# Patient Record
Sex: FEMALE | Race: WHITE | Employment: OTHER | URBAN - NONMETROPOLITAN AREA
[De-identification: names, ages, dates, MRNs, and addresses within clinical notes are randomized per-mention and may not be internally consistent; named-entity substitution may affect disease eponyms.]

---

## 2019-05-02 ENCOUNTER — TRANSFERRED RECORDS (OUTPATIENT)
Dept: HEALTH INFORMATION MANAGEMENT | Facility: HOSPITAL | Age: 64
End: 2019-05-02

## 2019-09-16 NOTE — PROGRESS NOTES
Dasia Clement is a 64 year old female who presents to clinic today for the following health issues:        New Patient/Transfer of Care  Was seen at [facility] for years      History of Elevated A1C - 6.4 on 5/1/19    How often are you checking your blood sugar? Not at all    What time of day are you checking your blood sugars (select all that apply)?  na    Have you had any blood sugars above 200?  No    Have you had any blood sugars below 70?  No    What symptoms do you notice when your blood sugar is low?  None    What concerns do you have today about your diabetes? None     Do you have any of these symptoms? (Select all that apply)  No numbness or tingling in feet.  No redness, sores or blisters on feet.  No complaints of excessive thirst.  No reports of blurry vision.  No significant changes to weight.     Have you had a diabetic eye exam in the last 12 months? No        BP Readings from Last 2 Encounters:   09/17/19 118/76     No results found for: A1C, LDL       Diabetes Management Resources          Hypothyroidism Follow-up    Since last visit, patient describes the following symptoms: Weight stable, no hair loss, no skin changes, no constipation, no loose stools, dry skin, anxiety, fatigue and hair loss      PHQ-9 SCORE 9/17/2019   PHQ-9 Total Score 2     JESSA-7 SCORE 9/17/2019   Total Score 2           Patient Active Problem List   Diagnosis     Degenerative joint disease of cervical and lumbar spine     Essential hypertension     Hypothyroidism due to Hashimoto's thyroiditis     Impaired fasting glucose     Obesity, unspecified     Osteoarthritis of lumbar spine with myelopathy     Chronic pain syndrome     Advanced directives, counseling/discussion     History reviewed. No pertinent surgical history.    Social History     Tobacco Use     Smoking status: Former Smoker     Packs/day: 1.00     Years: 12.00     Pack years: 12.00     Types: Cigarettes     Start date: 1985     Last attempt to quit: 1997     " Years since quittin.7     Smokeless tobacco: Never Used   Substance Use Topics     Alcohol use: Never     Frequency: Never     History reviewed. No pertinent family history.        Current Outpatient Medications   Medication Sig Dispense Refill     cholecalciferol (VITAMIN D3) 5000 units (125 mcg) capsule Take 5,000 Units by mouth See Admin Instructions Patient taking once a week       cyclobenzaprine (FLEXERIL) 10 MG tablet Take 10 mg by mouth       diclofenac (VOLTAREN) 1 % topical gel Apply 4 g topically       furosemide (LASIX) 20 MG tablet Take 20 mg by mouth       hydrochlorothiazide (HYDRODIURIL) 25 MG tablet Take 25 mg by mouth       levothyroxine (SYNTHROID/LEVOTHROID) 112 MCG tablet Take 112 mcg by mouth           Allergies   Allergen Reactions     Amitriptyline      Possibly too high of a dose--sleeping,vomiting,hives     Erythromycin Hives and Unknown     thraot closing     Levofloxacin      Pain in feet , neck and hands       Tramadol Other (See Comments)     tongue swelled     No lab results found.   BP Readings from Last 3 Encounters:   19 118/76    Wt Readings from Last 3 Encounters:   19 86 kg (189 lb 11.2 oz)                 Reviewed and updated as needed this visit by Provider  Tobacco         Review of Systems   ROS COMP: Constitutional, HEENT, cardiovascular, pulmonary, GI, , musculoskeletal, neuro, skin, endocrine and psych systems are negative, except as otherwise noted.      Objective    /76 (BP Location: Right arm, Patient Position: Sitting, Cuff Size: Adult Regular)   Pulse 95   Temp 95.8  F (35.4  C) (Tympanic)   Ht 1.6 m (5' 3\")   Wt 86 kg (189 lb 11.2 oz)   SpO2 98%   BMI 33.60 kg/m    Body mass index is 33.6 kg/m .     Physical Exam   GENERAL: healthy, alert and no distress  EYES: Eyes grossly normal to inspection, PERRL and conjunctivae and sclerae normal  NECK: no adenopathy, no asymmetry, masses, or scars and thyroid normal to palpation  RESP: lungs " clear to auscultation - no rales, rhonchi or wheezes  CV: regular rate and rhythm, normal S1 S2, no S3 or S4, no murmur, click or rub, no peripheral edema and peripheral pulses strong  MS: multilevel back pain, bilateral foot pain  SKIN: no suspicious lesions or rashes  PSYCH: mentation appears normal, affect normal/bright  Diabetic foot exam: normal DP and PT pulses, no trophic changes or ulcerative lesions and normal sensory exam      Results for orders placed or performed in visit on 09/17/19   *UA reflex to Microscopic   Result Value Ref Range    Color Urine Yellow     Appearance Urine Clear     Glucose Urine Negative NEG^Negative mg/dL    Bilirubin Urine Small (A) NEG^Negative    Ketones Urine Negative NEG^Negative mg/dL    Specific Gravity Urine 1.015 1.003 - 1.035    Blood Urine Small (A) NEG^Negative    pH Urine 6.0 5.0 - 7.0 pH    Protein Albumin Urine Negative NEG^Negative mg/dL    Urobilinogen Urine 0.2 0.2 - 1.0 EU/dL    Nitrite Urine Negative NEG^Negative    Leukocyte Esterase Urine Negative NEG^Negative    Source Midstream Urine    Albumin Random Urine Quantitative with Creat Ratio   Result Value Ref Range    Creatinine Urine 245 mg/dL    Albumin Urine mg/L 15 mg/L    Albumin Urine mg/g Cr 6.29 0 - 25 mg/g Cr   Hemoglobin A1c   Result Value Ref Range    Hemoglobin A1C 6.3 (H) 0 - 5.6 %   Lipid Profile   Result Value Ref Range    Cholesterol 141 <200 mg/dL    Triglycerides 111 <150 mg/dL    HDL Cholesterol 46 (L) >49 mg/dL    LDL Cholesterol Calculated 73 <100 mg/dL    Non HDL Cholesterol 95 <130 mg/dL   TSH with free T4 reflex   Result Value Ref Range    TSH 3.38 0.40 - 4.00 mU/L   Comprehensive metabolic panel (BMP + Alb, Alk Phos, ALT, AST, Total. Bili, TP)   Result Value Ref Range    Sodium 140 133 - 144 mmol/L    Potassium 3.7 3.4 - 5.3 mmol/L    Chloride 105 94 - 109 mmol/L    Carbon Dioxide 23 20 - 32 mmol/L    Anion Gap 12 3 - 14 mmol/L    Glucose 123 (H) 70 - 99 mg/dL    Urea Nitrogen 18 7 - 30  mg/dL    Creatinine 0.69 0.52 - 1.04 mg/dL    GFR Estimate >90 >60 mL/min/[1.73_m2]    GFR Estimate If Black >90 >60 mL/min/[1.73_m2]    Calcium 10.1 8.5 - 10.1 mg/dL    Bilirubin Total 0.3 0.2 - 1.3 mg/dL    Albumin 4.1 3.4 - 5.0 g/dL    Protein Total 8.2 6.8 - 8.8 g/dL    Alkaline Phosphatase 67 40 - 150 U/L    ALT 30 0 - 50 U/L    AST 13 0 - 45 U/L   Urine Microscopic   Result Value Ref Range    WBC Urine 0 - 5 OTO5^0 - 5 /HPF    RBC Urine O - 2 OTO2^O - 2 /HPF    Squamous Epithelial /LPF Urine Moderate (A) FEW^Few /LPF    Bacteria Urine Few (A) NEG^Negative /HPF   Estimated Average Glucose   Result Value Ref Range    Estimated Average Glucose 134 mg/dL           Visit time:     Over 40  minutes  spent with the patient.   Greater than 50% of our visit time was spent face to face and included patient education and counseling regarding current conditions.   Medication management, and plan of care.    Impaired fasting glucose with history of elevated A1C - discussed diet  Chronic pain management discussed  Detailed plan of care is provided, AVS printed  Visit Content:   Referrals: [facility]  Diagnostic testing reviewed  Reviewed appropriate outside records  Lab testing reviewed  Any medication adjustment has been reviewed  Health Maintenance   Updated as appropriate.  Record review completed         Assessment & Plan     1. Encounter to establish care  - Chart updated    2. Hypothyroidism due to Hashimoto's thyroiditis  - TSH with free T4 reflex    3. Impaired fasting glucose  - UA reflex to Microscopic  - Albumin Random Urine Quantitative with Creat Ratio  - Hemoglobin A1c    4. Essential hypertension  - Lipid Profile  - Comprehensive metabolic panel (BMP + Alb, Alk Phos, ALT, AST, Total. Bili, TP)    5. Osteoarthritis of lumbar spine with myelopathy  - Information given regarding medical marijuana    6. Chronic pain syndrome  - See above    7. Advanced directives, counseling/discussion  - Discussed         Return  in 6 months (on 3/17/2020) for Chronic disease management, am fasting.       Sydnee Quispe, CNP  [facility]

## 2019-09-17 ENCOUNTER — OFFICE VISIT (OUTPATIENT)
Dept: FAMILY MEDICINE | Facility: OTHER | Age: 64
End: 2019-09-17
Attending: NURSE PRACTITIONER
Payer: COMMERCIAL

## 2019-09-17 VITALS
BODY MASS INDEX: 33.61 KG/M2 | HEART RATE: 95 BPM | HEIGHT: 63 IN | SYSTOLIC BLOOD PRESSURE: 118 MMHG | OXYGEN SATURATION: 98 % | WEIGHT: 189.7 LBS | DIASTOLIC BLOOD PRESSURE: 76 MMHG | TEMPERATURE: 95.8 F

## 2019-09-17 DIAGNOSIS — E06.3 HYPOTHYROIDISM DUE TO HASHIMOTO'S THYROIDITIS: ICD-10-CM

## 2019-09-17 DIAGNOSIS — Z71.89 ADVANCED DIRECTIVES, COUNSELING/DISCUSSION: ICD-10-CM

## 2019-09-17 DIAGNOSIS — M47.16 OSTEOARTHRITIS OF LUMBAR SPINE WITH MYELOPATHY: ICD-10-CM

## 2019-09-17 DIAGNOSIS — G89.4 CHRONIC PAIN SYNDROME: ICD-10-CM

## 2019-09-17 DIAGNOSIS — R73.01 IMPAIRED FASTING GLUCOSE: ICD-10-CM

## 2019-09-17 DIAGNOSIS — Z76.89 ENCOUNTER TO ESTABLISH CARE: Primary | ICD-10-CM

## 2019-09-17 DIAGNOSIS — I10 ESSENTIAL HYPERTENSION: ICD-10-CM

## 2019-09-17 LAB
ALBUMIN SERPL-MCNC: 4.1 G/DL (ref 3.4–5)
ALBUMIN UR-MCNC: NEGATIVE MG/DL
ALP SERPL-CCNC: 67 U/L (ref 40–150)
ALT SERPL W P-5'-P-CCNC: 30 U/L (ref 0–50)
ANION GAP SERPL CALCULATED.3IONS-SCNC: 12 MMOL/L (ref 3–14)
APPEARANCE UR: CLEAR
AST SERPL W P-5'-P-CCNC: 13 U/L (ref 0–45)
BACTERIA #/AREA URNS HPF: ABNORMAL /HPF
BILIRUB SERPL-MCNC: 0.3 MG/DL (ref 0.2–1.3)
BILIRUB UR QL STRIP: ABNORMAL
BUN SERPL-MCNC: 18 MG/DL (ref 7–30)
CALCIUM SERPL-MCNC: 10.1 MG/DL (ref 8.5–10.1)
CHLORIDE SERPL-SCNC: 105 MMOL/L (ref 94–109)
CHOLEST SERPL-MCNC: 141 MG/DL
CO2 SERPL-SCNC: 23 MMOL/L (ref 20–32)
COLOR UR AUTO: YELLOW
CREAT SERPL-MCNC: 0.69 MG/DL (ref 0.52–1.04)
CREAT UR-MCNC: 245 MG/DL
EST. AVERAGE GLUCOSE BLD GHB EST-MCNC: 134 MG/DL
GFR SERPL CREATININE-BSD FRML MDRD: >90 ML/MIN/{1.73_M2}
GLUCOSE SERPL-MCNC: 123 MG/DL (ref 70–99)
GLUCOSE UR STRIP-MCNC: NEGATIVE MG/DL
HBA1C MFR BLD: 6.3 % (ref 0–5.6)
HDLC SERPL-MCNC: 46 MG/DL
HGB UR QL STRIP: ABNORMAL
KETONES UR STRIP-MCNC: NEGATIVE MG/DL
LDLC SERPL CALC-MCNC: 73 MG/DL
LEUKOCYTE ESTERASE UR QL STRIP: NEGATIVE
MICROALBUMIN UR-MCNC: 15 MG/L
MICROALBUMIN/CREAT UR: 6.29 MG/G CR (ref 0–25)
NITRATE UR QL: NEGATIVE
NON-SQ EPI CELLS #/AREA URNS LPF: ABNORMAL /LPF
NONHDLC SERPL-MCNC: 95 MG/DL
PH UR STRIP: 6 PH (ref 5–7)
POTASSIUM SERPL-SCNC: 3.7 MMOL/L (ref 3.4–5.3)
PROT SERPL-MCNC: 8.2 G/DL (ref 6.8–8.8)
RBC #/AREA URNS AUTO: ABNORMAL /HPF
SODIUM SERPL-SCNC: 140 MMOL/L (ref 133–144)
SOURCE: ABNORMAL
SP GR UR STRIP: 1.01 (ref 1–1.03)
TRIGL SERPL-MCNC: 111 MG/DL
TSH SERPL DL<=0.005 MIU/L-ACNC: 3.38 MU/L (ref 0.4–4)
UROBILINOGEN UR STRIP-ACNC: 0.2 EU/DL (ref 0.2–1)
WBC #/AREA URNS AUTO: ABNORMAL /HPF

## 2019-09-17 PROCEDURE — 82043 UR ALBUMIN QUANTITATIVE: CPT | Performed by: NURSE PRACTITIONER

## 2019-09-17 PROCEDURE — 84443 ASSAY THYROID STIM HORMONE: CPT | Performed by: NURSE PRACTITIONER

## 2019-09-17 PROCEDURE — 80061 LIPID PANEL: CPT | Performed by: NURSE PRACTITIONER

## 2019-09-17 PROCEDURE — 99203 OFFICE O/P NEW LOW 30 MIN: CPT | Performed by: NURSE PRACTITIONER

## 2019-09-17 PROCEDURE — 81001 URINALYSIS AUTO W/SCOPE: CPT | Performed by: NURSE PRACTITIONER

## 2019-09-17 PROCEDURE — 36415 COLL VENOUS BLD VENIPUNCTURE: CPT | Performed by: NURSE PRACTITIONER

## 2019-09-17 PROCEDURE — 80053 COMPREHEN METABOLIC PANEL: CPT | Performed by: NURSE PRACTITIONER

## 2019-09-17 PROCEDURE — 83036 HEMOGLOBIN GLYCOSYLATED A1C: CPT | Performed by: NURSE PRACTITIONER

## 2019-09-17 RX ORDER — FUROSEMIDE 20 MG
20 TABLET ORAL
COMMUNITY
Start: 2019-05-14 | End: 2020-11-03

## 2019-09-17 RX ORDER — HYDROCHLOROTHIAZIDE 25 MG/1
25 TABLET ORAL DAILY
Qty: 90 TABLET | Refills: 1 | Status: SHIPPED | OUTPATIENT
Start: 2019-09-17 | End: 2020-07-02

## 2019-09-17 RX ORDER — CYCLOBENZAPRINE HCL 10 MG
10 TABLET ORAL 2 TIMES DAILY PRN
Qty: 90 TABLET | Refills: 1 | Status: SHIPPED | OUTPATIENT
Start: 2019-09-17 | End: 2020-01-13

## 2019-09-17 RX ORDER — CYCLOBENZAPRINE HCL 10 MG
10 TABLET ORAL
COMMUNITY
Start: 2019-06-28 | End: 2019-09-17

## 2019-09-17 RX ORDER — HYDROCHLOROTHIAZIDE 25 MG/1
25 TABLET ORAL
COMMUNITY
Start: 2019-05-02 | End: 2019-09-17

## 2019-09-17 RX ORDER — LEVOTHYROXINE SODIUM 112 UG/1
112 TABLET ORAL DAILY
Qty: 90 TABLET | Refills: 1 | Status: SHIPPED | OUTPATIENT
Start: 2019-09-17 | End: 2020-07-02

## 2019-09-17 RX ORDER — LEVOTHYROXINE SODIUM 112 UG/1
112 TABLET ORAL
COMMUNITY
Start: 2019-05-02 | End: 2019-09-17

## 2019-09-17 SDOH — HEALTH STABILITY: MENTAL HEALTH: HOW OFTEN DO YOU HAVE A DRINK CONTAINING ALCOHOL?: NEVER

## 2019-09-17 ASSESSMENT — ANXIETY QUESTIONNAIRES
7. FEELING AFRAID AS IF SOMETHING AWFUL MIGHT HAPPEN: NOT AT ALL
3. WORRYING TOO MUCH ABOUT DIFFERENT THINGS: NOT AT ALL
6. BECOMING EASILY ANNOYED OR IRRITABLE: NOT AT ALL
2. NOT BEING ABLE TO STOP OR CONTROL WORRYING: NOT AT ALL
1. FEELING NERVOUS, ANXIOUS, OR ON EDGE: NOT AT ALL
5. BEING SO RESTLESS THAT IT IS HARD TO SIT STILL: NOT AT ALL
GAD7 TOTAL SCORE: 2
IF YOU CHECKED OFF ANY PROBLEMS ON THIS QUESTIONNAIRE, HOW DIFFICULT HAVE THESE PROBLEMS MADE IT FOR YOU TO DO YOUR WORK, TAKE CARE OF THINGS AT HOME, OR GET ALONG WITH OTHER PEOPLE: SOMEWHAT DIFFICULT

## 2019-09-17 ASSESSMENT — PATIENT HEALTH QUESTIONNAIRE - PHQ9
SUM OF ALL RESPONSES TO PHQ QUESTIONS 1-9: 2
5. POOR APPETITE OR OVEREATING: MORE THAN HALF THE DAYS

## 2019-09-17 ASSESSMENT — PAIN SCALES - GENERAL: PAINLEVEL: EXTREME PAIN (8)

## 2019-09-17 ASSESSMENT — MIFFLIN-ST. JEOR: SCORE: 1379.6

## 2019-09-17 NOTE — RESULT ENCOUNTER NOTE
Labs look good overall.  A1C a bit elevated, have her work on diet, carbs  Recheck A1C in 3 mos    The 10-year ASCVD risk score (Puma DUMONT Jr., et al., 2013) is: 3.8%    Values used to calculate the score:      Age: 64 years      Sex: Female      Is Non- : No      Diabetic: No      Tobacco smoker: No      Systolic Blood Pressure: 118 mmHg      Is BP treated: No      HDL Cholesterol: 46 mg/dL      Total Cholesterol: 141 mg/dL    TSH normal.  Will send her synthroid Rx for refill, [facility].    Sydnee Quispe [facility]  [phone]

## 2019-09-17 NOTE — PATIENT INSTRUCTIONS
Assessment & Plan     1. Encounter to establish care  - Chart updated    2. Hypothyroidism due to Hashimoto's thyroiditis  - TSH with free T4 reflex    3. Impaired fasting glucose  - UA reflex to Microscopic  - Albumin Random Urine Quantitative with Creat Ratio  - Hemoglobin A1c    4. Essential hypertension  - Lipid Profile  - Comprehensive metabolic panel (BMP + Alb, Alk Phos, ALT, AST, Total. Bili, TP)    5. Osteoarthritis of lumbar spine with myelopathy  - Information given regarding medical marijuana    6. Chronic pain syndrome  - See above    7. Advanced directives, counseling/discussion  - Discussed        Return in 6 months (on 3/17/2020) for Chronic disease management, am fasting.         Sydnee Quispe, CNP  [facility]

## 2019-09-17 NOTE — LETTER
[facility]  [address]  [place] [address]  Phone: [phone]    September 18, 2019        Dasia Muñoz  [address] [place] [address]    Dear Dasia;    Attached are your lab results from yesterdays visit.  Please call with any questions.      Results for orders placed or performed in visit on 09/17/19   *UA reflex to Microscopic   Result Value Ref Range    Color Urine Yellow     Appearance Urine Clear     Glucose Urine Negative NEG^Negative mg/dL    Bilirubin Urine Small (A) NEG^Negative    Ketones Urine Negative NEG^Negative mg/dL    Specific Gravity Urine 1.015 1.003 - 1.035    Blood Urine Small (A) NEG^Negative    pH Urine 6.0 5.0 - 7.0 pH    Protein Albumin Urine Negative NEG^Negative mg/dL    Urobilinogen Urine 0.2 0.2 - 1.0 EU/dL    Nitrite Urine Negative NEG^Negative    Leukocyte Esterase Urine Negative NEG^Negative    Source Midstream Urine    Albumin Random Urine Quantitative with Creat Ratio   Result Value Ref Range    Creatinine Urine 245 mg/dL    Albumin Urine mg/L 15 mg/L    Albumin Urine mg/g Cr 6.29 0 - 25 mg/g Cr   Hemoglobin A1c   Result Value Ref Range    Hemoglobin A1C 6.3 (H) 0 - 5.6 %   Lipid Profile   Result Value Ref Range    Cholesterol 141 <200 mg/dL    Triglycerides 111 <150 mg/dL    HDL Cholesterol 46 (L) >49 mg/dL    LDL Cholesterol Calculated 73 <100 mg/dL    Non HDL Cholesterol 95 <130 mg/dL   TSH with free T4 reflex   Result Value Ref Range    TSH 3.38 0.40 - 4.00 mU/L   Comprehensive metabolic panel (BMP + Alb, Alk Phos, ALT, AST, Total. Bili, TP)   Result Value Ref Range    Sodium 140 133 - 144 mmol/L    Potassium 3.7 3.4 - 5.3 mmol/L    Chloride 105 94 - 109 mmol/L    Carbon Dioxide 23 20 - 32 mmol/L    Anion Gap 12 3 - 14 mmol/L    Glucose 123 (H) 70 - 99 mg/dL    Urea Nitrogen 18 7 - 30 mg/dL    Creatinine 0.69 0.52 - 1.04 mg/dL    GFR Estimate >90 >60 mL/min/[1.73_m2]    GFR Estimate If Black >90 >60 mL/min/[1.73_m2]     Calcium 10.1 8.5 - 10.1 mg/dL    Bilirubin Total 0.3 0.2 - 1.3 mg/dL    Albumin 4.1 3.4 - 5.0 g/dL    Protein Total 8.2 6.8 - 8.8 g/dL    Alkaline Phosphatase 67 40 - 150 U/L    ALT 30 0 - 50 U/L    AST 13 0 - 45 U/L   Urine Microscopic   Result Value Ref Range    WBC Urine 0 - 5 OTO5^0 - 5 /HPF    RBC Urine O - 2 OTO2^O - 2 /HPF    Squamous Epithelial /LPF Urine Moderate (A) FEW^Few /LPF    Bacteria Urine Few (A) NEG^Negative /HPF   Estimated Average Glucose   Result Value Ref Range    Estimated Average Glucose 134 mg/dL         Please contact me for questions or concerns.      Sincerely,        Sydnee Quispe, CNP

## 2019-09-18 ENCOUNTER — TELEPHONE (OUTPATIENT)
Dept: FAMILY MEDICINE | Facility: OTHER | Age: 64
End: 2019-09-18

## 2019-09-18 ENCOUNTER — MYC REFILL (OUTPATIENT)
Dept: FAMILY MEDICINE | Facility: OTHER | Age: 64
End: 2019-09-18

## 2019-09-18 DIAGNOSIS — G89.4 CHRONIC PAIN SYNDROME: ICD-10-CM

## 2019-09-18 DIAGNOSIS — M47.16 OSTEOARTHRITIS OF LUMBAR SPINE WITH MYELOPATHY: ICD-10-CM

## 2019-09-18 RX ORDER — CYCLOBENZAPRINE HCL 10 MG
10 TABLET ORAL 2 TIMES DAILY PRN
Qty: 90 TABLET | Refills: 1 | OUTPATIENT
Start: 2019-09-18

## 2019-09-18 ASSESSMENT — ANXIETY QUESTIONNAIRES: GAD7 TOTAL SCORE: 2

## 2019-09-18 NOTE — TELEPHONE ENCOUNTER
Reason for call:  REFERRAL   1. Concern: Referral / pain  2. Have you seen a provider for this concern? Yes  3. Who? Sydnee Quispe  4. When? 09-17-19  5. What services are you requesting? Medical Marijuana Ddtor  Description: Pt saw Sydnee Quispe at [facility] on 09-17-19. She is requesting a referral to a medical marijuana doctor.  Was an appointment offered for this a call? No  If Yes:  Appointment type                Date    Preferred method for responding to this message: Telephone Call  What is your phone number ? [phone]    If we cannot reach you directly, may we leave a detailed response at the number you provided? Yes   Can this message wait until your PCP/Provider returns if not available today? Not applicable, provider is in today

## 2019-09-19 NOTE — TELEPHONE ENCOUNTER
We don't do a referral.  Patients call [facility] in [place], the information is available online.    [facility]ydnee [facility]IA[facility]  [phone]

## 2019-10-05 ENCOUNTER — HEALTH MAINTENANCE LETTER (OUTPATIENT)
Age: 64
End: 2019-10-05

## 2020-01-13 DIAGNOSIS — G89.4 CHRONIC PAIN SYNDROME: ICD-10-CM

## 2020-01-13 DIAGNOSIS — M47.16 OSTEOARTHRITIS OF LUMBAR SPINE WITH MYELOPATHY: ICD-10-CM

## 2020-01-13 RX ORDER — CYCLOBENZAPRINE HCL 10 MG
10 TABLET ORAL 2 TIMES DAILY PRN
Qty: 90 TABLET | Refills: 1 | Status: SHIPPED | OUTPATIENT
Start: 2020-01-13 | End: 2020-10-19

## 2020-01-13 NOTE — TELEPHONE ENCOUNTER
Flexeril       Last Written Prescription Date:  9/17/19  Last Fill Quantity: 90,   # refills: 1  Last Office Visit: 9/17/19  Future Office visit:       Routing refill request to provider for review/approval because:  Drug not on the [facility], [facility] or [facility] refill protocol or controlled substance

## 2020-01-22 ENCOUNTER — TELEPHONE (OUTPATIENT)
Dept: FAMILY MEDICINE | Facility: OTHER | Age: 65
End: 2020-01-22

## 2020-01-22 NOTE — TELEPHONE ENCOUNTER
2:02 PM    Reason for Call: Phone Call    Description: Patient called and wanted you to know that she  qualified for medical marijuana and started it in October it has been helping greatly. She is down to 1.5 muscle relaxers today and helping with anxiety and the pain. She wanted to make sure it was in her chart.

## 2020-01-28 ENCOUNTER — TELEPHONE (OUTPATIENT)
Dept: FAMILY MEDICINE | Facility: OTHER | Age: 65
End: 2020-01-28

## 2020-01-28 DIAGNOSIS — H65.00 ACUTE SEROUS OTITIS MEDIA, RECURRENCE NOT SPECIFIED, UNSPECIFIED LATERALITY: Primary | ICD-10-CM

## 2020-01-28 NOTE — TELEPHONE ENCOUNTER
Augmentin sent to pharmacy    Insure adequate fluid intake  Get plenty of rest  Monitor for temp at home, treat with OTC Tylenol or Ibuprofen per package instruction.  Humidity at home (add bacteriostatic solution to humidifier)  Please return in you do not improve  To UC or ER with persistent, worsening, or concerning symptoms    Sydnee LE  [phone]

## 2020-01-28 NOTE — TELEPHONE ENCOUNTER
Pt calling, states she has right ear pain and pressure for 4 days  No drainage, requesting antibiotic, doesn't want to come into the clinic during flu season.    Please call pt.    hillary Frnacois LPN

## 2020-07-02 DIAGNOSIS — E06.3 HYPOTHYROIDISM DUE TO HASHIMOTO'S THYROIDITIS: ICD-10-CM

## 2020-07-02 DIAGNOSIS — I10 ESSENTIAL HYPERTENSION: ICD-10-CM

## 2020-07-02 RX ORDER — HYDROCHLOROTHIAZIDE 25 MG/1
TABLET ORAL
Qty: 90 TABLET | Refills: 0 | Status: SHIPPED | OUTPATIENT
Start: 2020-07-02 | End: 2020-09-23

## 2020-07-02 RX ORDER — LEVOTHYROXINE SODIUM 112 UG/1
TABLET ORAL
Qty: 90 TABLET | Refills: 0 | Status: SHIPPED | OUTPATIENT
Start: 2020-07-02 | End: 2020-09-23

## 2020-07-02 NOTE — TELEPHONE ENCOUNTER
hydrochlorothiazide (HYDRODIURIL) 25 MG tablet  Last Written Prescription Date:  9/17/19  Last Fill Quantity: 90,   # refills: 1  Last Office Visit: 9/17/19  Future Office visit:          levothyroxine (SYNTHROID/LEVOTHROID) 112 MCG tablet     Last Written Prescription Date:  9/17/19  Last Fill Quantity: 90,   # refills: 90  Last Office Visit: 9/17/19  Future Office visit:

## 2020-09-23 DIAGNOSIS — I10 ESSENTIAL HYPERTENSION: ICD-10-CM

## 2020-09-23 DIAGNOSIS — E06.3 HYPOTHYROIDISM DUE TO HASHIMOTO'S THYROIDITIS: ICD-10-CM

## 2020-09-23 RX ORDER — LEVOTHYROXINE SODIUM 112 UG/1
TABLET ORAL
Qty: 90 TABLET | Refills: 0 | Status: SHIPPED | OUTPATIENT
Start: 2020-09-23 | End: 2020-10-19

## 2020-09-23 RX ORDER — HYDROCHLOROTHIAZIDE 25 MG/1
TABLET ORAL
Qty: 90 TABLET | Refills: 0 | Status: SHIPPED | OUTPATIENT
Start: 2020-09-23 | End: 2020-10-27

## 2020-10-15 DIAGNOSIS — R73.01 IMPAIRED FASTING GLUCOSE: ICD-10-CM

## 2020-10-15 DIAGNOSIS — I10 ESSENTIAL HYPERTENSION: ICD-10-CM

## 2020-10-15 DIAGNOSIS — E06.3 HYPOTHYROIDISM DUE TO HASHIMOTO'S THYROIDITIS: Primary | ICD-10-CM

## 2020-10-19 ENCOUNTER — MYC REFILL (OUTPATIENT)
Dept: FAMILY MEDICINE | Facility: OTHER | Age: 65
End: 2020-10-19

## 2020-10-19 DIAGNOSIS — M47.16 OSTEOARTHRITIS OF LUMBAR SPINE WITH MYELOPATHY: ICD-10-CM

## 2020-10-19 DIAGNOSIS — E06.3 HYPOTHYROIDISM DUE TO HASHIMOTO'S THYROIDITIS: ICD-10-CM

## 2020-10-19 DIAGNOSIS — G89.4 CHRONIC PAIN SYNDROME: ICD-10-CM

## 2020-10-20 DIAGNOSIS — I10 ESSENTIAL HYPERTENSION: ICD-10-CM

## 2020-10-20 RX ORDER — LEVOTHYROXINE SODIUM 112 UG/1
112 TABLET ORAL DAILY
Qty: 30 TABLET | Refills: 0 | Status: SHIPPED | OUTPATIENT
Start: 2020-10-20 | End: 2020-10-27

## 2020-10-20 RX ORDER — CYCLOBENZAPRINE HCL 10 MG
10 TABLET ORAL 2 TIMES DAILY PRN
Qty: 30 TABLET | Refills: 0 | Status: SHIPPED | OUTPATIENT
Start: 2020-10-20 | End: 2020-10-27

## 2020-10-20 NOTE — TELEPHONE ENCOUNTER
Patient called for refills.  Failed protocol due for TSH.  Labs are ordered as future order.  Patient states she cancelled her appointment because she does not want to come into the clinic at this time because of Covid.    levothyroxine (SYNTHROID/LEVOTHROID) 112 MCG tablet      Last Written Prescription Date:  9/23/20  Last Fill Quantity: 90,   # refills: 0  Last Office Visit: 9/17/19  Future Office visit:       Routing refill request to provider for review/approval because:  Drug not on the [facility], [facility] or [facility] refill protocol or controlled substance      cyclobenzaprine (FLEXERIL) 10 MG tablet      Last Written Prescription Date:  1/13/20  Last Fill Quantity: 90,   # refills: 1  Last Office Visit: 9/17/20  Future Office visit:       Routing refill request to provider for review/approval because:  Drug not on the [facility], [facility] or [facility] refill protocol or controlled substance

## 2020-10-22 NOTE — TELEPHONE ENCOUNTER
I filled a limited supply   She needs an office visit   LOV over 1 year ago     Sydnee GARCIA-DAMIR   [phone]    Routing comment      Left message for pt to return call to clinic to schedule yearly physical.

## 2020-10-27 ENCOUNTER — VIRTUAL VISIT (OUTPATIENT)
Dept: FAMILY MEDICINE | Facility: OTHER | Age: 65
End: 2020-10-27
Attending: NURSE PRACTITIONER
Payer: COMMERCIAL

## 2020-10-27 VITALS
DIASTOLIC BLOOD PRESSURE: 75 MMHG | WEIGHT: 180 LBS | SYSTOLIC BLOOD PRESSURE: 118 MMHG | BODY MASS INDEX: 31.89 KG/M2 | HEIGHT: 63 IN

## 2020-10-27 DIAGNOSIS — G89.4 CHRONIC PAIN SYNDROME: ICD-10-CM

## 2020-10-27 DIAGNOSIS — E06.3 HYPOTHYROIDISM DUE TO HASHIMOTO'S THYROIDITIS: Primary | ICD-10-CM

## 2020-10-27 DIAGNOSIS — M47.16 OSTEOARTHRITIS OF LUMBAR SPINE WITH MYELOPATHY: ICD-10-CM

## 2020-10-27 DIAGNOSIS — R73.01 IMPAIRED FASTING GLUCOSE: ICD-10-CM

## 2020-10-27 DIAGNOSIS — I10 ESSENTIAL HYPERTENSION: ICD-10-CM

## 2020-10-27 PROCEDURE — 99214 OFFICE O/P EST MOD 30 MIN: CPT | Mod: 95 | Performed by: NURSE PRACTITIONER

## 2020-10-27 RX ORDER — LEVOTHYROXINE SODIUM 112 UG/1
112 TABLET ORAL DAILY
Qty: 90 TABLET | Refills: 1 | Status: SHIPPED | OUTPATIENT
Start: 2020-10-27 | End: 2020-11-03

## 2020-10-27 RX ORDER — HYDROCHLOROTHIAZIDE 25 MG/1
TABLET ORAL
Qty: 90 TABLET | Refills: 1 | Status: SHIPPED | OUTPATIENT
Start: 2020-10-27 | End: 2020-11-03

## 2020-10-27 RX ORDER — CYCLOBENZAPRINE HCL 10 MG
10 TABLET ORAL 2 TIMES DAILY PRN
Qty: 60 TABLET | Refills: 3 | Status: SHIPPED | OUTPATIENT
Start: 2020-10-27 | End: 2020-11-03

## 2020-10-27 ASSESSMENT — MIFFLIN-ST. JEOR: SCORE: 1330.6

## 2020-10-27 ASSESSMENT — PAIN SCALES - GENERAL: PAINLEVEL: MODERATE PAIN (5)

## 2020-10-27 ASSESSMENT — PATIENT HEALTH QUESTIONNAIRE - PHQ9: SUM OF ALL RESPONSES TO PHQ QUESTIONS 1-9: 0

## 2020-10-27 NOTE — PROGRESS NOTES
"Dasia Muñoz is a 65 year old female who is being evaluated via a billable telephone visit.      The patient has been notified of following:     \"This telephone visit will be conducted via a call between you and your physician/provider. We have found that certain health care needs can be provided without the need for a physical exam.  This service lets us provide the care you need with a short phone conversation.  If a prescription is necessary we can send it directly to your pharmacy.  If lab work is needed we can place an order for that and you can then stop by our lab to have the test done at a later time.    Telephone visits are billed at different rates depending on your insurance coverage. During this emergency period, for some insurers they may be billed the same as an in-person visit.  Please reach out to your insurance provider with any questions.    If during the course of the call the physician/provider feels a telephone visit is not appropriate, you will not be charged for this service.\"    Patient has given verbal consent for Telephone visit?  Yes    What phone number would you like to be contacted at? [phone]    How would you like to obtain your AVS? Mail a copy and mychart      Dasia Muñoz is a 65 year old female who presents via phone visit today for the following health issues:          Hypertension Follow-up    Do you check your blood pressure regularly outside of the clinic? Yes     Are you following a low salt diet? Yes    Are your blood pressures ever more than 140 on the top number (systolic) OR more   than 90 on the bottom number (diastolic), for example 140/90? No      Hypothyroidism Follow-up    Since last visit, patient describes the following symptoms: Weight stable, no hair loss, no skin changes, no constipation, no loose stools     Insurance will not cover synthroid, talked about GoodRx      Chronic Pain Follow-Up  Where in your body do you have pain? Low back and " feet  How has your pain affected your ability to work? Not applicable  Which of these pain treatments have you tried since your last clinic visit? Other: medical cannabis  How well are you sleeping? Good  How has your mood been since your last visit? Better  Have you had a significant life event? No  Other aggravating factors: prolonged sitting and lifting, bending over  Taking medication as directed? Yes      PHQ 2019 10/27/2020   PHQ-9 Total Score 2 0   Q9: Thoughts of better off dead/self-harm past 2 weeks Not at all Not at all     JESSA-7 SCORE 2019   Total Score 2       No flowsheet data found.     Encounter-Level CSA:    There are no encounter-level csa.     Patient-Level CSA:    There are no patient-level csa.         Patient Active Problem List   Diagnosis     Degenerative joint disease of cervical and lumbar spine     Essential hypertension     Hypothyroidism due to Hashimoto's thyroiditis     Impaired fasting glucose     Obesity, unspecified     Osteoarthritis of lumbar spine with myelopathy     Chronic pain syndrome     Advanced directives, counseling/discussion     No past surgical history on file.    Social History     Tobacco Use     Smoking status: Former Smoker     Packs/day: 1.00     Years: 12.00     Pack years: 12.00     Types: Cigarettes     Start date:      Quit date:      Years since quittin.8     Smokeless tobacco: Never Used   Substance Use Topics     Alcohol use: Never     Frequency: Never     No family history on file.        Current Outpatient Medications   Medication Sig Dispense Refill     cholecalciferol (VITAMIN D3) 5000 units (125 mcg) capsule Take 5,000 Units by mouth See Admin Instructions Patient taking once a week       cyclobenzaprine (FLEXERIL) 10 MG tablet Take 1 tablet (10 mg) by mouth 2 times daily as needed for muscle spasms 60 tablet 3     diclofenac (VOLTAREN) 1 % topical gel Apply 4 g topically       furosemide (LASIX) 20 MG tablet Take 20 mg by mouth        hydrochlorothiazide (HYDRODIURIL) 25 MG tablet Take 1 Tab by mouth one time a day. 90 tablet 1     levothyroxine (SYNTHROID/LEVOTHROID) 112 MCG tablet Take 1 tablet (112 mcg) by mouth daily 90 tablet 1         Allergies   Allergen Reactions     Amitriptyline      Possibly too high of a dose--sleeping,vomiting,hives     Erythromycin Hives and Unknown     thraot closing     Levofloxacin      Pain in feet , neck and hands       Tramadol Other (See Comments)     tongue swelled       Recent Labs   Lab Test 09/17/19  1057   A1C 6.3*   LDL 73   HDL 46*   TRIG 111   ALT 30   CR 0.69   GFRESTIMATED >90   GFRESTBLACK >90   POTASSIUM 3.7   TSH 3.38          BP Readings from Last 3 Encounters:   10/27/20 118/75   09/17/19 118/76    Wt Readings from Last 3 Encounters:   10/27/20 81.6 kg (180 lb)   09/17/19 86 kg (189 lb 11.2 oz)             Review of Systems   Constitutional, HEENT, cardiovascular, pulmonary, gi and gu systems are negative, except as otherwise noted.       Objective   Vitals - Patient Reported  Pain Score: Moderate Pain (5)  Pain Loc: Low Back  Vitals:  No vitals were obtained today due to virtual visit.  healthy, alert and no distress  PSYCH: Alert and oriented times 3; coherent speech, normal   rate and volume, able to articulate logical thoughts, able   to abstract reason, no tangential thoughts, no hallucinations   or delusions  Her affect is normal  RESP: No cough, no audible wheezing, able to talk in full sentences  Remainder of exam unable to be completed due to telephone visits            Assessment & Plan     Hypothyroidism due to Hashimoto's thyroiditis  - levothyroxine (SYNTHROID/LEVOTHROID) 112 MCG tablet; Take 1 tablet (112 mcg) by mouth daily  - TSH with free T4 reflex; Future    Essential hypertension  - hydrochlorothiazide (HYDRODIURIL) 25 MG tablet; Take 1 Tab by mouth one time a day.  - Comprehensive metabolic panel; Future  - UA reflex to Microscopic and Culture - [facility];  Future  - Lipid Profile (Chol, Trig, HDL, LDL calc); Future    Chronic pain syndrome  - cyclobenzaprine (FLEXERIL) 10 MG tablet; Take 1 tablet (10 mg) by mouth 2 times daily as needed for muscle spasms    Osteoarthritis of lumbar spine with myelopathy  - cyclobenzaprine (FLEXERIL) 10 MG tablet; Take 1 tablet (10 mg) by mouth 2 times daily as needed for muscle spasms    Impaired fasting glucose  - Hemoglobin A1c; Future         Phone call duration:  15  minutes      Return in about 6 months (around 4/27/2021) for Chronic disease management, am fasting.    Sydnee Quispe CNP  [facility]

## 2020-10-27 NOTE — PATIENT INSTRUCTIONS
Assessment & Plan     Hypothyroidism due to Hashimoto's thyroiditis  - levothyroxine (SYNTHROID/LEVOTHROID) 112 MCG tablet; Take 1 tablet (112 mcg) by mouth daily  - TSH with free T4 reflex; Future    Essential hypertension  - hydrochlorothiazide (HYDRODIURIL) 25 MG tablet; Take 1 Tab by mouth one time a day.  - Comprehensive metabolic panel; Future  - UA reflex to Microscopic and Culture - [facility]; Future  - Lipid Profile (Chol, Trig, HDL, LDL calc); Future    Chronic pain syndrome  - cyclobenzaprine (FLEXERIL) 10 MG tablet; Take 1 tablet (10 mg) by mouth 2 times daily as needed for muscle spasms    Osteoarthritis of lumbar spine with myelopathy  - cyclobenzaprine (FLEXERIL) 10 MG tablet; Take 1 tablet (10 mg) by mouth 2 times daily as needed for muscle spasms    Impaired fasting glucose  - Hemoglobin A1c; Future         Return in about 6 months (around 4/27/2021) for Chronic disease management, am fasting.    Sydnee Quispe CNP  [facility]

## 2020-11-03 ENCOUNTER — MYC MEDICAL ADVICE (OUTPATIENT)
Dept: FAMILY MEDICINE | Facility: OTHER | Age: 65
End: 2020-11-03

## 2020-11-03 DIAGNOSIS — R82.90 NONSPECIFIC FINDING ON EXAMINATION OF URINE: Primary | ICD-10-CM

## 2020-11-03 DIAGNOSIS — G89.4 CHRONIC PAIN SYNDROME: ICD-10-CM

## 2020-11-03 DIAGNOSIS — E06.3 HYPOTHYROIDISM DUE TO HASHIMOTO'S THYROIDITIS: ICD-10-CM

## 2020-11-03 DIAGNOSIS — I10 ESSENTIAL HYPERTENSION: ICD-10-CM

## 2020-11-03 DIAGNOSIS — M47.16 OSTEOARTHRITIS OF LUMBAR SPINE WITH MYELOPATHY: ICD-10-CM

## 2020-11-03 DIAGNOSIS — R73.01 IMPAIRED FASTING GLUCOSE: ICD-10-CM

## 2020-11-03 LAB
ALBUMIN SERPL-MCNC: 3.9 G/DL (ref 3.4–5)
ALBUMIN UR-MCNC: NEGATIVE MG/DL
ALP SERPL-CCNC: 69 U/L (ref 40–150)
ALT SERPL W P-5'-P-CCNC: 28 U/L (ref 0–50)
ANION GAP SERPL CALCULATED.3IONS-SCNC: 8 MMOL/L (ref 3–14)
APPEARANCE UR: CLEAR
AST SERPL W P-5'-P-CCNC: 14 U/L (ref 0–45)
BACTERIA #/AREA URNS HPF: ABNORMAL /HPF
BASOPHILS # BLD AUTO: 0.1 10E9/L (ref 0–0.2)
BASOPHILS NFR BLD AUTO: 0.8 %
BILIRUB SERPL-MCNC: 0.4 MG/DL (ref 0.2–1.3)
BILIRUB UR QL STRIP: NEGATIVE
BUN SERPL-MCNC: 16 MG/DL (ref 7–30)
CALCIUM SERPL-MCNC: 9.6 MG/DL (ref 8.5–10.1)
CHLORIDE SERPL-SCNC: 105 MMOL/L (ref 94–109)
CHOLEST SERPL-MCNC: 160 MG/DL
CO2 SERPL-SCNC: 26 MMOL/L (ref 20–32)
COLOR UR AUTO: YELLOW
CREAT SERPL-MCNC: 0.74 MG/DL (ref 0.52–1.04)
DIFFERENTIAL METHOD BLD: NORMAL
EOSINOPHIL # BLD AUTO: 0.3 10E9/L (ref 0–0.7)
EOSINOPHIL NFR BLD AUTO: 3.2 %
ERYTHROCYTE [DISTWIDTH] IN BLOOD BY AUTOMATED COUNT: 14.2 % (ref 10–15)
EST. AVERAGE GLUCOSE BLD GHB EST-MCNC: 131 MG/DL
GFR SERPL CREATININE-BSD FRML MDRD: 84 ML/MIN/{1.73_M2}
GLUCOSE SERPL-MCNC: 141 MG/DL (ref 70–99)
GLUCOSE UR STRIP-MCNC: NEGATIVE MG/DL
HBA1C MFR BLD: 6.2 % (ref 0–5.6)
HCT VFR BLD AUTO: 46.2 % (ref 35–47)
HDLC SERPL-MCNC: 53 MG/DL
HGB BLD-MCNC: 15.2 G/DL (ref 11.7–15.7)
HGB UR QL STRIP: ABNORMAL
KETONES UR STRIP-MCNC: NEGATIVE MG/DL
LDLC SERPL CALC-MCNC: 85 MG/DL
LEUKOCYTE ESTERASE UR QL STRIP: ABNORMAL
LYMPHOCYTES # BLD AUTO: 2.8 10E9/L (ref 0.8–5.3)
LYMPHOCYTES NFR BLD AUTO: 30 %
MCH RBC QN AUTO: 29.9 PG (ref 26.5–33)
MCHC RBC AUTO-ENTMCNC: 32.9 G/DL (ref 31.5–36.5)
MCV RBC AUTO: 91 FL (ref 78–100)
MONOCYTES # BLD AUTO: 0.5 10E9/L (ref 0–1.3)
MONOCYTES NFR BLD AUTO: 5.4 %
NEUTROPHILS # BLD AUTO: 5.6 10E9/L (ref 1.6–8.3)
NEUTROPHILS NFR BLD AUTO: 60.6 %
NITRATE UR QL: NEGATIVE
NON-SQ EPI CELLS #/AREA URNS LPF: ABNORMAL /LPF
NONHDLC SERPL-MCNC: 107 MG/DL
PH UR STRIP: 6 PH (ref 5–7)
PLATELET # BLD AUTO: 321 10E9/L (ref 150–450)
POTASSIUM SERPL-SCNC: 3.9 MMOL/L (ref 3.4–5.3)
PROT SERPL-MCNC: 7.9 G/DL (ref 6.8–8.8)
RBC # BLD AUTO: 5.09 10E12/L (ref 3.8–5.2)
RBC #/AREA URNS AUTO: ABNORMAL /HPF
SODIUM SERPL-SCNC: 139 MMOL/L (ref 133–144)
SOURCE: ABNORMAL
SP GR UR STRIP: 1.02 (ref 1–1.03)
TRIGL SERPL-MCNC: 112 MG/DL
TSH SERPL DL<=0.005 MIU/L-ACNC: 2.22 MU/L (ref 0.4–4)
UROBILINOGEN UR STRIP-ACNC: 0.2 EU/DL (ref 0.2–1)
WBC # BLD AUTO: 9.3 10E9/L (ref 4–11)
WBC #/AREA URNS AUTO: ABNORMAL /HPF

## 2020-11-03 PROCEDURE — 80053 COMPREHEN METABOLIC PANEL: CPT | Mod: ZL | Performed by: NURSE PRACTITIONER

## 2020-11-03 PROCEDURE — 81001 URINALYSIS AUTO W/SCOPE: CPT | Mod: ZL | Performed by: NURSE PRACTITIONER

## 2020-11-03 PROCEDURE — 83036 HEMOGLOBIN GLYCOSYLATED A1C: CPT | Mod: ZL | Performed by: NURSE PRACTITIONER

## 2020-11-03 PROCEDURE — 87086 URINE CULTURE/COLONY COUNT: CPT | Mod: ZL | Performed by: NURSE PRACTITIONER

## 2020-11-03 PROCEDURE — 84443 ASSAY THYROID STIM HORMONE: CPT | Mod: ZL | Performed by: NURSE PRACTITIONER

## 2020-11-03 PROCEDURE — 80061 LIPID PANEL: CPT | Mod: ZL | Performed by: NURSE PRACTITIONER

## 2020-11-03 PROCEDURE — 36415 COLL VENOUS BLD VENIPUNCTURE: CPT | Mod: ZL | Performed by: NURSE PRACTITIONER

## 2020-11-03 PROCEDURE — 999N001182 HC STATISTIC ESTIMATED AVERAGE GLUCOSE: Mod: ZL | Performed by: NURSE PRACTITIONER

## 2020-11-03 PROCEDURE — 85025 COMPLETE CBC W/AUTO DIFF WBC: CPT | Mod: ZL | Performed by: NURSE PRACTITIONER

## 2020-11-03 RX ORDER — HYDROCHLOROTHIAZIDE 25 MG/1
TABLET ORAL
Qty: 90 TABLET | Refills: 1 | Status: SHIPPED | OUTPATIENT
Start: 2020-11-03 | End: 2021-01-08

## 2020-11-03 RX ORDER — FUROSEMIDE 20 MG
20 TABLET ORAL DAILY
Qty: 90 TABLET | Refills: 1 | Status: SHIPPED | OUTPATIENT
Start: 2020-11-03

## 2020-11-03 RX ORDER — CYCLOBENZAPRINE HCL 10 MG
10 TABLET ORAL 2 TIMES DAILY PRN
Qty: 60 TABLET | Refills: 3 | Status: SHIPPED | OUTPATIENT
Start: 2020-11-03

## 2020-11-03 RX ORDER — LEVOTHYROXINE SODIUM 112 UG/1
112 TABLET ORAL DAILY
Qty: 90 TABLET | Refills: 1 | Status: SHIPPED | OUTPATIENT
Start: 2020-11-03 | End: 2021-04-09

## 2020-11-03 NOTE — RESULT ENCOUNTER NOTE
Normal lipids  Normal TSH  A1C is elevated, work on diet  UC pending    All meds have been filled    [facility]CI[facility]  [phone]

## 2020-11-04 LAB
BACTERIA SPEC CULT: NORMAL
SPECIMEN SOURCE: NORMAL

## 2020-11-14 ENCOUNTER — HEALTH MAINTENANCE LETTER (OUTPATIENT)
Age: 65
End: 2020-11-14

## 2021-01-08 DIAGNOSIS — I10 ESSENTIAL HYPERTENSION: ICD-10-CM

## 2021-01-08 RX ORDER — HYDROCHLOROTHIAZIDE 25 MG/1
TABLET ORAL
Qty: 90 TABLET | Refills: 0 | Status: SHIPPED | OUTPATIENT
Start: 2021-01-08 | End: 2021-04-09

## 2021-01-08 NOTE — TELEPHONE ENCOUNTER
Hydrochlorothiazide 25 mg  Last Visit 10/27/20  Last Fill 11/03/20  Next Visit 4/28/21    Thank you

## 2021-07-18 ENCOUNTER — HEALTH MAINTENANCE LETTER (OUTPATIENT)
Age: 66
End: 2021-07-18

## 2021-09-12 ENCOUNTER — HEALTH MAINTENANCE LETTER (OUTPATIENT)
Age: 66
End: 2021-09-12

## 2022-01-02 ENCOUNTER — HEALTH MAINTENANCE LETTER (OUTPATIENT)
Age: 67
End: 2022-01-02

## 2022-11-19 ENCOUNTER — HEALTH MAINTENANCE LETTER (OUTPATIENT)
Age: 67
End: 2022-11-19

## 2023-04-09 ENCOUNTER — HEALTH MAINTENANCE LETTER (OUTPATIENT)
Age: 68
End: 2023-04-09

## 2023-09-09 ENCOUNTER — HEALTH MAINTENANCE LETTER (OUTPATIENT)
Age: 68
End: 2023-09-09

## 2024-06-17 PROBLEM — Z71.89 ADVANCED DIRECTIVES, COUNSELING/DISCUSSION: Status: RESOLVED | Noted: 2019-09-17 | Resolved: 2024-06-17
